# Patient Record
Sex: MALE | Race: WHITE | NOT HISPANIC OR LATINO | Employment: FULL TIME | URBAN - METROPOLITAN AREA
[De-identification: names, ages, dates, MRNs, and addresses within clinical notes are randomized per-mention and may not be internally consistent; named-entity substitution may affect disease eponyms.]

---

## 2018-01-07 ENCOUNTER — HOSPITAL ENCOUNTER (EMERGENCY)
Facility: HOSPITAL | Age: 33
Discharge: HOME | End: 2018-01-07
Attending: EMERGENCY MEDICINE | Admitting: EMERGENCY MEDICINE

## 2018-01-07 VITALS
TEMPERATURE: 96.5 F | HEART RATE: 68 BPM | DIASTOLIC BLOOD PRESSURE: 84 MMHG | HEIGHT: 70 IN | RESPIRATION RATE: 17 BRPM | WEIGHT: 170 LBS | BODY MASS INDEX: 24.34 KG/M2 | OXYGEN SATURATION: 98 % | SYSTOLIC BLOOD PRESSURE: 141 MMHG

## 2018-01-07 DIAGNOSIS — L03.114 CELLULITIS OF LEFT HAND: Primary | ICD-10-CM

## 2018-01-07 PROCEDURE — 99283 EMERGENCY DEPT VISIT LOW MDM: CPT

## 2018-01-07 RX ORDER — NAPROXEN SODIUM 550 MG/1
550 TABLET ORAL 2 TIMES DAILY WITH MEALS
Qty: 20 TABLET | Refills: 0 | Status: SHIPPED | OUTPATIENT
Start: 2018-01-07

## 2018-01-07 NOTE — ED PROVIDER NOTES
Pt presents to the ED complaining of a blister to his left ring finger that began about one week ago. Pt states that he has had worsening swelling along his left hand and left ring fingers but denies having a FB in his left hand. Pt states that he has taken two doses of clindamycin without improvement of his symptoms. On exam, the pt has a bulla to the palmar aspect of the left fourth digit with associated erythema and swelling to the proximal phalanx of the fourth digit. I agree with the plan to discharge the pt home with instructions to continue his course of clindamycin and a referral to [facility].     I supervised care provided by the midlevel provider.    We have discussed this patient's history, physical exam, and treatment plan.   I have reviewed the note and personally saw and examined the patient and agree with the plan of care.    Documentation assistance provided by maría Brock for Dr. Arriola.  Information recorded by the vaughnibjustine was done at my direction and has been verified and validated by me.     Maribel Brock  01/07/18 0232       Tariq Arriola MD  01/08/18 1257

## 2018-01-07 NOTE — ED PROVIDER NOTES
EMERGENCY DEPARTMENT ENCOUNTER    CHIEF COMPLAINT  Chief Complaint: Finger Injury  History given by: Patient  History limited by:   Room Number: 33/33  PMD: No Known Provider      HPI:  Pt is a 32 y.o. male who presents complaining of finger injury to the L ring finger that onset a week ago. Pt reports that he was chopping wood while wearing his wedding band. He later noticed a blister to the area. Pt was seen at [facility] and started on Clindamycin. Pt reports that today he noted some increased swelling to the area. He reports some warmth to the area. Pt denies any erythema or streaking. He denies any fever. He denies any FB. He is scheduled to have imaging of the hand tomorrow.     Duration: 1 week  Onset: gradual  Timing: constant  Location: L ring finger  Radiation: none  Quality: blister  Intensity/Severity: moderate  Progression: worsened  Associated Symptoms: none  Aggravating Factors: none  Alleviating Factors: none  Previous Episodes: none  Treatment before arrival: has taken Clindamycin without improvement    PAST MEDICAL HISTORY  Active Ambulatory Problems     Diagnosis Date Noted   • No Active Ambulatory Problems     Resolved Ambulatory Problems     Diagnosis Date Noted   • No Resolved Ambulatory Problems     Past Medical History:   Diagnosis Date   • Asthma    • Migraine        PAST SURGICAL HISTORY  Past Surgical History:   Procedure Laterality Date   • DENTAL PROCEDURE         FAMILY HISTORY  History reviewed. No pertinent family history.    SOCIAL HISTORY  Social History     Social History   • Marital status:      Spouse name: N/A   • Number of children: N/A   • Years of education: N/A     Occupational History   • Not on file.     Social History Main Topics   • Smoking status: Never Smoker   • Smokeless tobacco: Not on file   • Alcohol use Yes      Comment: occ   • Drug use: No   • Sexual activity: Not on file     Other Topics Concern   • Not on file     Social History Narrative   • No narrative on  file       ALLERGIES  Penicillins    REVIEW OF SYSTEMS  Review of Systems   Constitutional: Negative for fever.   Respiratory: Negative for shortness of breath.    Cardiovascular: Negative for chest pain.   Skin: Negative for color change and wound.        Blister L index finger       PHYSICAL EXAM  ED Triage Vitals   Temp Heart Rate Resp BP SpO2   01/07/18 0059 01/07/18 0059 01/07/18 0059 01/07/18 0059 01/07/18 0059   96.5 °F (35.8 °C) 82 16 159/87 98 %      Temp src Heart Rate Source Patient Position BP Location FiO2 (%)   01/07/18 0059 01/07/18 0059 01/07/18 0059 01/07/18 0059 --   Tympanic Monitor Sitting Right arm        Physical Exam   Constitutional: No distress.   HENT:   Head: Normocephalic and atraumatic.   Eyes: EOM are normal.   Neck: Normal range of motion.   Cardiovascular: Normal heart sounds.    Pulmonary/Chest: No respiratory distress.   Abdominal: There is no tenderness.   Musculoskeletal: He exhibits no edema.        Left hand: He exhibits swelling (mild soft tissue involving the base of the 4th and 5th digit over the ulnar aspect. Mild erythema).   Neurological: He is alert.   Skin: Skin is warm and dry.   Nursing note and vitals reviewed.        PROCEDURES  Procedures      PROGRESS AND CONSULTS  ED Course   1:50 AM  Reviewed pt's history and workup with Dr. Arriola.  After a bedside evaluation; Dr Arriola agrees with the plan of care. Will have pt continue taking Clindamycin and follow-up with hand specialist.          MEDICAL DECISION MAKING  Results were reviewed/discussed with the patient and they were also made aware of online access. Pt also made aware that some labs, such as cultures, will not be resulted during ER visit and follow up with PMD is necessary.     MDM       DIAGNOSIS  Final diagnoses:   Cellulitis of left hand       DISPOSITION  DISCHARGE    Patient discharged in stable condition.    Reviewed implications of results, diagnosis, meds, responsibility to follow up, warning signs and  symptoms of possible worsening, potential complications and reasons to return to ER.    Patient/Family voiced understanding of above instructions.    Discussed plan for discharge, as there is no emergent indication for admission.  Pt/family is agreeable and understands need for follow up and repeat testing.  Pt is aware that discharge does not mean that nothing is wrong but it indicates no emergency is present that requires admission and they must continue care with follow-up as given below or physician of their choice.     FOLLOW-UP  Moi Vick MD  [address] [place] [address]  [phone]    Schedule an appointment as soon as possible for a visit  For further evaluation and treatment         Medication List      New Prescriptions          naproxen sodium 550 MG tablet   Commonly known as:  ANAPROX   Take 1 tablet by mouth 2 (Two) Times a Day With Meals.               Latest Documented Vital Signs:  As of 5:04 AM  BP- 141/84 HR- 68 Temp- 96.5 °F (35.8 °C) (Tympanic) O2 sat- 98%    --  Documentation assistance provided by maría Diaz for Richi King PA-C.  Information recorded by the scribe was done at my direction and has been verified and validated by me.     Shiva Diaz  01/07/18 0233       Shiva Diaz  01/07/18 0253       MIGUELINA Hurtado III  01/07/18 [id number]

## 2018-01-07 NOTE — ED TRIAGE NOTES
Patient states he had a blister on his hand from his wedding band, and he stated that his hand was a little swollen but now is more swollen after starting antibiotics.

## 2018-01-07 NOTE — DISCHARGE INSTRUCTIONS
Return to the ER if your condition should worsen, should you notice any lymphangitis, or should you develop a fever.  Continue with the clindamycin as previously directed.  Elevate hand above the heart to help with swelling.

## 2018-06-20 ENCOUNTER — TELEPHONE (OUTPATIENT)
Dept: FAMILY MEDICINE CLINIC | Facility: CLINIC | Age: 33
End: 2018-06-20

## 2018-06-20 NOTE — TELEPHONE ENCOUNTER
Mr Monterroso is requesting a new patient appointment.  His parents are patients of yours. His fathers name is Nitin CRISTOBAL 1953.  Thanks